# Patient Record
Sex: MALE | Race: BLACK OR AFRICAN AMERICAN | NOT HISPANIC OR LATINO | URBAN - METROPOLITAN AREA
[De-identification: names, ages, dates, MRNs, and addresses within clinical notes are randomized per-mention and may not be internally consistent; named-entity substitution may affect disease eponyms.]

---

## 2024-03-27 ENCOUNTER — HOSPITAL ENCOUNTER (OUTPATIENT)
Facility: HOSPITAL | Age: 59
Setting detail: OBSERVATION
Discharge: HOME | End: 2024-03-28
Attending: EMERGENCY MEDICINE | Admitting: NURSE PRACTITIONER

## 2024-03-27 ENCOUNTER — APPOINTMENT (OUTPATIENT)
Dept: CARDIOLOGY | Facility: HOSPITAL | Age: 59
End: 2024-03-27

## 2024-03-27 ENCOUNTER — APPOINTMENT (OUTPATIENT)
Dept: RADIOLOGY | Facility: HOSPITAL | Age: 59
End: 2024-03-27

## 2024-03-27 ENCOUNTER — CLINICAL SUPPORT (OUTPATIENT)
Dept: EMERGENCY MEDICINE | Facility: HOSPITAL | Age: 59
End: 2024-03-27

## 2024-03-27 DIAGNOSIS — R55 SYNCOPE AND COLLAPSE: Primary | ICD-10-CM

## 2024-03-27 LAB
ALBUMIN SERPL BCP-MCNC: 4.3 G/DL (ref 3.4–5)
ALP SERPL-CCNC: 94 U/L (ref 33–120)
ALT SERPL W P-5'-P-CCNC: 18 U/L (ref 10–52)
ANION GAP BLDV CALCULATED.4IONS-SCNC: 6 MMOL/L (ref 10–25)
ANION GAP SERPL CALC-SCNC: 16 MMOL/L (ref 10–20)
AST SERPL W P-5'-P-CCNC: 20 U/L (ref 9–39)
ATRIAL RATE: 78 BPM
BASE EXCESS BLDV CALC-SCNC: 9.2 MMOL/L (ref -2–3)
BASOPHILS # BLD AUTO: 0.04 X10*3/UL (ref 0–0.1)
BASOPHILS NFR BLD AUTO: 0.6 %
BILIRUB SERPL-MCNC: 0.5 MG/DL (ref 0–1.2)
BNP SERPL-MCNC: 11 PG/ML (ref 0–99)
BODY TEMPERATURE: 37 DEGREES CELSIUS
BUN SERPL-MCNC: 5 MG/DL (ref 6–23)
CA-I BLDV-SCNC: 1.18 MMOL/L (ref 1.1–1.33)
CALCIUM SERPL-MCNC: 9.5 MG/DL (ref 8.6–10.6)
CARDIAC TROPONIN I PNL SERPL HS: 3 NG/L (ref 0–53)
CARDIAC TROPONIN I PNL SERPL HS: 3 NG/L (ref 0–53)
CHLORIDE BLDV-SCNC: 98 MMOL/L (ref 98–107)
CHLORIDE SERPL-SCNC: 98 MMOL/L (ref 98–107)
CO2 SERPL-SCNC: 29 MMOL/L (ref 21–32)
CREAT SERPL-MCNC: 0.63 MG/DL (ref 0.5–1.3)
EGFRCR SERPLBLD CKD-EPI 2021: >90 ML/MIN/1.73M*2
EOSINOPHIL # BLD AUTO: 0.09 X10*3/UL (ref 0–0.7)
EOSINOPHIL NFR BLD AUTO: 1.4 %
ERYTHROCYTE [DISTWIDTH] IN BLOOD BY AUTOMATED COUNT: 12.4 % (ref 11.5–14.5)
GLUCOSE BLDV-MCNC: 135 MG/DL (ref 74–99)
GLUCOSE SERPL-MCNC: 117 MG/DL (ref 74–99)
HCO3 BLDV-SCNC: 35.8 MMOL/L (ref 22–26)
HCT VFR BLD AUTO: 43.4 % (ref 41–52)
HCT VFR BLD EST: 49 % (ref 41–52)
HGB BLD-MCNC: 15.6 G/DL (ref 13.5–17.5)
HGB BLDV-MCNC: 16.3 G/DL (ref 13.5–17.5)
HOLD SPECIMEN: NORMAL
IMM GRANULOCYTES # BLD AUTO: 0.02 X10*3/UL (ref 0–0.7)
IMM GRANULOCYTES NFR BLD AUTO: 0.3 % (ref 0–0.9)
LACTATE BLDV-SCNC: 1.7 MMOL/L (ref 0.4–2)
LACTATE BLDV-SCNC: 2.7 MMOL/L (ref 0.4–2)
LYMPHOCYTES # BLD AUTO: 1.22 X10*3/UL (ref 1.2–4.8)
LYMPHOCYTES NFR BLD AUTO: 19.1 %
MAGNESIUM SERPL-MCNC: 1.66 MG/DL (ref 1.6–2.4)
MCH RBC QN AUTO: 32.3 PG (ref 26–34)
MCHC RBC AUTO-ENTMCNC: 35.9 G/DL (ref 32–36)
MCV RBC AUTO: 90 FL (ref 80–100)
MONOCYTES # BLD AUTO: 0.42 X10*3/UL (ref 0.1–1)
MONOCYTES NFR BLD AUTO: 6.6 %
NEUTROPHILS # BLD AUTO: 4.59 X10*3/UL (ref 1.2–7.7)
NEUTROPHILS NFR BLD AUTO: 72 %
NRBC BLD-RTO: 0 /100 WBCS (ref 0–0)
OXYHGB MFR BLDV: 67.8 % (ref 45–75)
P AXIS: 65 DEGREES
P OFFSET: 184 MS
P ONSET: 125 MS
PCO2 BLDV: 54 MM HG (ref 41–51)
PH BLDV: 7.43 PH (ref 7.33–7.43)
PLATELET # BLD AUTO: 240 X10*3/UL (ref 150–450)
PO2 BLDV: 48 MM HG (ref 35–45)
POTASSIUM BLDV-SCNC: 3.1 MMOL/L (ref 3.5–5.3)
POTASSIUM SERPL-SCNC: 3 MMOL/L (ref 3.5–5.3)
PR INTERVAL: 162 MS
PROT SERPL-MCNC: 7.4 G/DL (ref 6.4–8.2)
Q ONSET: 206 MS
QRS COUNT: 13 BEATS
QRS DURATION: 108 MS
QT INTERVAL: 364 MS
QTC CALCULATION(BAZETT): 414 MS
QTC FREDERICIA: 397 MS
R AXIS: 30 DEGREES
RBC # BLD AUTO: 4.83 X10*6/UL (ref 4.5–5.9)
SAO2 % BLDV: 72 % (ref 45–75)
SODIUM BLDV-SCNC: 137 MMOL/L (ref 136–145)
SODIUM SERPL-SCNC: 140 MMOL/L (ref 136–145)
T AXIS: 54 DEGREES
T OFFSET: 388 MS
VENTRICULAR RATE: 78 BPM
WBC # BLD AUTO: 6.4 X10*3/UL (ref 4.4–11.3)

## 2024-03-27 PROCEDURE — 2500000004 HC RX 250 GENERAL PHARMACY W/ HCPCS (ALT 636 FOR OP/ED): Performed by: EMERGENCY MEDICINE

## 2024-03-27 PROCEDURE — 2550000001 HC RX 255 CONTRASTS: Performed by: NURSE PRACTITIONER

## 2024-03-27 PROCEDURE — 93010 ELECTROCARDIOGRAM REPORT: CPT | Performed by: NURSE PRACTITIONER

## 2024-03-27 PROCEDURE — 84132 ASSAY OF SERUM POTASSIUM: CPT

## 2024-03-27 PROCEDURE — 71045 X-RAY EXAM CHEST 1 VIEW: CPT

## 2024-03-27 PROCEDURE — 85025 COMPLETE CBC W/AUTO DIFF WBC: CPT | Performed by: NURSE PRACTITIONER

## 2024-03-27 PROCEDURE — 75574 CT ANGIO HRT W/3D IMAGE: CPT | Performed by: RADIOLOGY

## 2024-03-27 PROCEDURE — 96374 THER/PROPH/DIAG INJ IV PUSH: CPT

## 2024-03-27 PROCEDURE — 36415 COLL VENOUS BLD VENIPUNCTURE: CPT

## 2024-03-27 PROCEDURE — 71045 X-RAY EXAM CHEST 1 VIEW: CPT | Performed by: INTERNAL MEDICINE

## 2024-03-27 PROCEDURE — 83880 ASSAY OF NATRIURETIC PEPTIDE: CPT | Performed by: NURSE PRACTITIONER

## 2024-03-27 PROCEDURE — 99285 EMERGENCY DEPT VISIT HI MDM: CPT | Performed by: NURSE PRACTITIONER

## 2024-03-27 PROCEDURE — 83735 ASSAY OF MAGNESIUM: CPT | Performed by: NURSE PRACTITIONER

## 2024-03-27 PROCEDURE — 99285 EMERGENCY DEPT VISIT HI MDM: CPT | Mod: 25

## 2024-03-27 PROCEDURE — 96376 TX/PRO/DX INJ SAME DRUG ADON: CPT

## 2024-03-27 PROCEDURE — 93880 EXTRACRANIAL BILAT STUDY: CPT

## 2024-03-27 PROCEDURE — G0378 HOSPITAL OBSERVATION PER HR: HCPCS

## 2024-03-27 PROCEDURE — 2500000002 HC RX 250 W HCPCS SELF ADMINISTERED DRUGS (ALT 637 FOR MEDICARE OP, ALT 636 FOR OP/ED): Performed by: NURSE PRACTITIONER

## 2024-03-27 PROCEDURE — 84132 ASSAY OF SERUM POTASSIUM: CPT | Performed by: NURSE PRACTITIONER

## 2024-03-27 PROCEDURE — 93005 ELECTROCARDIOGRAM TRACING: CPT

## 2024-03-27 PROCEDURE — 83605 ASSAY OF LACTIC ACID: CPT

## 2024-03-27 PROCEDURE — 36415 COLL VENOUS BLD VENIPUNCTURE: CPT | Performed by: NURSE PRACTITIONER

## 2024-03-27 PROCEDURE — 84484 ASSAY OF TROPONIN QUANT: CPT | Performed by: NURSE PRACTITIONER

## 2024-03-27 PROCEDURE — 96361 HYDRATE IV INFUSION ADD-ON: CPT

## 2024-03-27 PROCEDURE — 2500000001 HC RX 250 WO HCPCS SELF ADMINISTERED DRUGS (ALT 637 FOR MEDICARE OP): Performed by: NURSE PRACTITIONER

## 2024-03-27 PROCEDURE — 82810 BLOOD GASES O2 SAT ONLY: CPT

## 2024-03-27 PROCEDURE — 2500000004 HC RX 250 GENERAL PHARMACY W/ HCPCS (ALT 636 FOR OP/ED): Performed by: NURSE PRACTITIONER

## 2024-03-27 PROCEDURE — 75574 CT ANGIO HRT W/3D IMAGE: CPT

## 2024-03-27 PROCEDURE — 93880 EXTRACRANIAL BILAT STUDY: CPT | Performed by: RADIOLOGY

## 2024-03-27 RX ORDER — POTASSIUM CHLORIDE 20 MEQ/1
40 TABLET, EXTENDED RELEASE ORAL DAILY
Status: DISCONTINUED | OUTPATIENT
Start: 2024-03-27 | End: 2024-03-28 | Stop reason: HOSPADM

## 2024-03-27 RX ORDER — LABETALOL HYDROCHLORIDE 5 MG/ML
80 INJECTION, SOLUTION INTRAVENOUS ONCE AS NEEDED
Status: COMPLETED | OUTPATIENT
Start: 2024-03-27 | End: 2024-03-27

## 2024-03-27 RX ORDER — LABETALOL 100 MG/1
400 TABLET, FILM COATED ORAL ONCE
Status: COMPLETED | OUTPATIENT
Start: 2024-03-27 | End: 2024-03-27

## 2024-03-27 RX ORDER — LORAZEPAM 2 MG/ML
0.5 INJECTION INTRAMUSCULAR EVERY 5 MIN PRN
Status: DISCONTINUED | OUTPATIENT
Start: 2024-03-27 | End: 2024-03-28 | Stop reason: HOSPADM

## 2024-03-27 RX ORDER — NITROGLYCERIN 0.4 MG/1
0.8 TABLET SUBLINGUAL ONCE
Status: COMPLETED | OUTPATIENT
Start: 2024-03-27 | End: 2024-03-27

## 2024-03-27 RX ORDER — LABETALOL HYDROCHLORIDE 5 MG/ML
40 INJECTION, SOLUTION INTRAVENOUS ONCE
Status: COMPLETED | OUTPATIENT
Start: 2024-03-27 | End: 2024-03-27

## 2024-03-27 RX ADMIN — POTASSIUM CHLORIDE 40 MEQ: 1500 TABLET, EXTENDED RELEASE ORAL at 09:11

## 2024-03-27 RX ADMIN — LABETALOL HYDROCHLORIDE 80 MG: 5 INJECTION, SOLUTION INTRAVENOUS at 14:08

## 2024-03-27 RX ADMIN — NITROGLYCERIN 0.8 MG: 0.4 TABLET SUBLINGUAL at 14:20

## 2024-03-27 RX ADMIN — LABETALOL HYDROCHLORIDE 80 MG: 5 INJECTION, SOLUTION INTRAVENOUS at 14:17

## 2024-03-27 RX ADMIN — LABETALOL HYDROCHLORIDE 400 MG: 100 TABLET, FILM COATED ORAL at 11:32

## 2024-03-27 RX ADMIN — SODIUM CHLORIDE, SODIUM LACTATE, POTASSIUM CHLORIDE, AND CALCIUM CHLORIDE 1000 ML: 600; 310; 30; 20 INJECTION, SOLUTION INTRAVENOUS at 10:15

## 2024-03-27 RX ADMIN — IOHEXOL 90 ML: 350 INJECTION, SOLUTION INTRAVENOUS at 14:35

## 2024-03-27 RX ADMIN — LABETALOL HYDROCHLORIDE 40 MG: 5 INJECTION, SOLUTION INTRAVENOUS at 13:34

## 2024-03-27 ASSESSMENT — COLUMBIA-SUICIDE SEVERITY RATING SCALE - C-SSRS
1. IN THE PAST MONTH, HAVE YOU WISHED YOU WERE DEAD OR WISHED YOU COULD GO TO SLEEP AND NOT WAKE UP?: NO
6. HAVE YOU EVER DONE ANYTHING, STARTED TO DO ANYTHING, OR PREPARED TO DO ANYTHING TO END YOUR LIFE?: NO
2. HAVE YOU ACTUALLY HAD ANY THOUGHTS OF KILLING YOURSELF?: NO

## 2024-03-27 ASSESSMENT — PAIN - FUNCTIONAL ASSESSMENT
PAIN_FUNCTIONAL_ASSESSMENT: 0-10
PAIN_FUNCTIONAL_ASSESSMENT: 0-10

## 2024-03-27 ASSESSMENT — PAIN SCALES - GENERAL
PAINLEVEL_OUTOF10: 0 - NO PAIN
PAINLEVEL_OUTOF10: 0 - NO PAIN

## 2024-03-27 NOTE — H&P
"History and Physical  [facility] CLINICAL DECISION  Patient: Brian Bob  MRN: [id number]  : 1965  Date of Evaluation: 3/27/2024  ED Provider: CAROL Hoover      Limitations to history: none  Independent Historian: patient  External Records Reviewed: outside records, inpatient notes, ed records      Patient History:  Brian Bob is a 58 y.o. male who presents to the emergency department after passing out.  Patient states that [place] [employer] [place] today and [place] out.  Patient remembers sweating and was dizzy.  Denies any chest pain or shortness of breath.  Patient states that he thinks someone caught him and lowered him down to the ground.  Denies any head injury, headache or visual changes.  Patient states that he has not seen a primary care doctor in years. Patient admits to going to a party last night and did \"many lines of cocaine.\"    PMH: Denies  PSH: hernia repair  Allergies: NKDA  Social HX: + smoker, occasional alcohol use, + cocaine use  Medications: Denies    The acute evaluation included:  Orders Placed This Encounter   Procedures    XR chest 1 view    CT angio coronary art with heartflow if score >30%    CBC and Auto Differential    Magnesium    Comprehensive metabolic panel    Blood Gas Venous Full Panel    Troponin I, High Sensitivity    B-type natriuretic peptide    Blood Gas Lactic Acid, Venous    Extra Tubes    Light Blue Top    Troponin I, High Sensitivity    Vital Signs    Notify provider    Nursing communication for Metoprolol/Labetalol dosing    Telemetry monitoring    ECG 12 lead    ECG 12 lead    Transthoracic Echo (TTE) Complete    Initiate observation status       I reviewed the below labs and imaging as ordered by the ED provider:  XR chest 1 view   Final Result   1.  No evidence of acute cardiopulmonary process.        I personally reviewed the image(s)/study and resident interpretation.   I agree with the findings as stated by resident " Jez Weber.   Data analyzed and images interpreted at [facility], [place], [place].        MACRO:   None        Signed by: Peng Hill 3/27/2024 10:12 AM   Dictation workstation:   [id number]      Vascular US carotid artery duplex bilateral    (Results Pending)   Transthoracic Echo (TTE) Complete    (Results Pending)   CT angio coronary art with heartflow if score >30%    (Results Pending)       Labs Reviewed   COMPREHENSIVE METABOLIC PANEL - Abnormal       Result Value    Glucose 117 (*)     Sodium 140      Potassium 3.0 (*)     Chloride 98      Bicarbonate 29      Anion Gap 16      Urea Nitrogen 5 (*)     Creatinine 0.63      eGFR >90      Calcium 9.5      Albumin 4.3      Alkaline Phosphatase 94      Total Protein 7.4      AST 20      Bilirubin, Total 0.5      ALT 18     BLOOD GAS VENOUS FULL PANEL UNSOLICITED - Abnormal    POCT pH, Venous 7.43      POCT pCO2, Venous 54 (*)     POCT pO2, Venous 48 (*)     POCT SO2, Venous 72      POCT Oxy Hemoglobin, Venous 67.8      POCT Hematocrit Calculated, Venous 49.0      POCT Sodium, Venous 137      POCT Potassium, Venous 3.1 (*)     POCT Chloride, Venous 98      POCT Ionized Calicum, Venous 1.18      POCT Glucose, Venous 135 (*)     POCT Lactate, Venous 2.7 (*)     POCT Base Excess, Venous 9.2 (*)     POCT HCO3 Calculated, Venous 35.8 (*)     POCT Hemoglobin, Venous 16.3      POCT Anion Gap, Venous 6.0 (*)     Patient Temperature 37.0     MAGNESIUM - Normal    Magnesium 1.66     TROPONIN I, HIGH SENSITIVITY - Normal    Troponin I, High Sensitivity 3      Narrative:     Less than 99th percentile of normal range cutoff-  Female and children under 18 years old <35 ng/L; Male <54 ng/L: Negative  Repeat testing should be performed if clinically indicated.     Female and children under 18 years old  ng/L; Male  ng/L:  Consistent with possible cardiac damage and possible increased clinical   risk. Serial measurements  may help to assess extent of myocardial damage.     >120 ng/L: Consistent with cardiac damage, increased clinical risk and  myocardial infarction. Serial measurements may help assess extent of   myocardial damage.      NOTE: Children less than 1 year old may have higher baseline troponin   levels and results should be interpreted in conjunction with the overall   clinical context.    NOTE: Troponin I testing is performed using a different   testing methodology at [facility] than at other   [facility]. Direct result comparisons should only   be made within the same method.     B-TYPE NATRIURETIC PEPTIDE - Normal    BNP 11      Narrative:        <100 pg/mL - Heart failure unlikely  100-299 pg/mL - Intermediate probability of acute heart                  failure exacerbation. Correlate with clinical                  context and patient history.    >=300 pg/mL - Heart Failure likely. Correlate with clinical                  context and patient history.     Biotin interference may cause falsely decreased results. Patients taking a Biotin dose of up to 5 mg/day should refrain from taking Biotin for 24 hours before sample  collection. Providers may contact their local laboratory for further information.   BLOOD GAS LACTIC ACID, VENOUS - Normal    POCT Lactate, Venous 1.7     CBC WITH AUTO DIFFERENTIAL    WBC 6.4      nRBC 0.0      RBC 4.83      Hemoglobin 15.6      Hematocrit 43.4      MCV 90      MCH 32.3      MCHC 35.9      RDW 12.4      Platelets 240      Neutrophils % 72.0      Immature Granulocytes %, Automated 0.3      Lymphocytes % 19.1      Monocytes % 6.6      Eosinophils % 1.4      Basophils % 0.6      Neutrophils Absolute 4.59      Immature Granulocytes Absolute, Automated 0.02      Lymphocytes Absolute 1.22      Monocytes Absolute 0.42      Eosinophils Absolute 0.09      Basophils Absolute 0.04     BLOOD GAS VENOUS FULL PANEL   TROPONIN I, HIGH SENSITIVITY           After discussion with the  "ED provider, a decision was made to admit the patient to the Clinical Decision Unit.    Upon admission to the Clinical Decision Unit, the patient's vital signs were: Blood pressure 151/107, HR 82, RR 18    Past History   No past medical history on file.  No past surgical history on file.  Social History     Socioeconomic History    Marital status:      Spouse name: Not on file    Number of children: Not on file    Years of education: Not on file    Highest education level: Not on file   Occupational History    Not on file   Tobacco Use    Smoking status: Not on file    Smokeless tobacco: Not on file   Substance and Sexual Activity    Alcohol use: Not on file    Drug use: Not on file    Sexual activity: Not on file   Other Topics Concern    Not on file   Social History Narrative    Not on file     Social Determinants of Health     Financial Resource Strain: Not on file   Food Insecurity: Not on file   Transportation Needs: Not on file   Physical Activity: Not on file   Stress: Not on file   Social Connections: Not on file   Intimate Partner Violence: Not on file   Housing Stability: Not on file         Medications/Allergies     Previous Medications    No medications on file     No Known Allergies      Review of Systems  All systems reviewed and otherwise negative, except as stated above in HPI.      Physical Exam     Visit Vitals  BP (!) 151/107   Pulse 82   Resp 18   Ht 1.702 m (5' 7\")   Wt 73.5 kg (162 lb)   SpO2 96%   BMI 25.37 kg/m²   BSA 1.86 m²         Physical Exam:    Appearance: Alert, oriented , cooperative,  in no acute distress. Well nourished & well hydrated.    Skin: Intact,  dry skin, no lesions, rash, petechiae or purpura.     Eyes: PERRLA, EOMs intact.    ENT: Hearing grossly intact. External auditory canals patent, tympanic membranes intact with visible landmarks. Nares patent, mucus membranes moist.     Neck: Supple, without meningismus.     Pulmonary: Clear bilaterally with good chest wall " excursion. No rales, rhonchi or wheezing. No accessory muscle use or stridor.    Cardiac: Normal S1, S2 without murmur, rub, gallop or extrasystole. No JVD, Carotids without bruits.    Abdomen: Soft, nontender, active bowel sounds.  No palpable organomegaly.  No rebound or guarding.      Musculoskeletal: Full range of motion. no pain, edema, or deformity. Pulses full and equal. No cyanosis, clubbing, or edema.    Neurological:  Cranial nerves II through XII are grossly intact, finger-nose touch is normal, normal sensation, no weakness, no focal findings identified.    Psychiatric: Appropriate mood and affect.           Impression and Plan  In summary, Brian Bob is admitted to the [facility] Clinical Decision Unit for syncope. Dr. Marrero is the CDU admission attending.    This patient has been risk-stratified based on available history, physical exam, and related study findings. Admission to the observation status for further diagnosis/treatment/monitoring of syncope is warranted clinically. This extended period of observation is specifically required to determine the need for hospitalization.     The goals of this admission based on the patient’s clinical problem list are:  1) Symptomatic improvement of symptoms  2) Stable vital signs    Syncope  - Per ED team: Carotid vascular US, Echo and Coronary CTA per requested.  - Trend troponin  - Cardiac Monitor  - Continue to monitor blood pressure readings

## 2024-03-28 ENCOUNTER — APPOINTMENT (OUTPATIENT)
Dept: CARDIOLOGY | Facility: HOSPITAL | Age: 59
End: 2024-03-28

## 2024-03-28 VITALS
HEART RATE: 63 BPM | TEMPERATURE: 97.4 F | WEIGHT: 162 LBS | SYSTOLIC BLOOD PRESSURE: 149 MMHG | BODY MASS INDEX: 25.43 KG/M2 | OXYGEN SATURATION: 99 % | HEIGHT: 67 IN | RESPIRATION RATE: 18 BRPM | DIASTOLIC BLOOD PRESSURE: 92 MMHG

## 2024-03-28 LAB
AORTIC VALVE MEAN GRADIENT: 4 MMHG
AORTIC VALVE PEAK VELOCITY: 1.24 M/S
AV PEAK GRADIENT: 6.2 MMHG
AVA (PEAK VEL): 3.62 CM2
AVA (VTI): 3.75 CM2
EJECTION FRACTION APICAL 4 CHAMBER: 48.9
EJECTION FRACTION: 51 %
LEFT ATRIUM VOLUME AREA LENGTH INDEX BSA: 21.3 ML/M2
LEFT VENTRICLE INTERNAL DIMENSION DIASTOLE: 5.2 CM (ref 3.5–6)
LEFT VENTRICULAR OUTFLOW TRACT DIAMETER: 2.3 CM
MITRAL VALVE E/A RATIO: 1.08
MITRAL VALVE E/E' RATIO: 6.39
RIGHT VENTRICLE FREE WALL PEAK S': 12.1 CM/S
TRICUSPID ANNULAR PLANE SYSTOLIC EXCURSION: 2.1 CM

## 2024-03-28 PROCEDURE — 2500000002 HC RX 250 W HCPCS SELF ADMINISTERED DRUGS (ALT 637 FOR MEDICARE OP, ALT 636 FOR OP/ED): Performed by: NURSE PRACTITIONER

## 2024-03-28 PROCEDURE — 93306 TTE W/DOPPLER COMPLETE: CPT | Performed by: SPECIALIST

## 2024-03-28 PROCEDURE — G0378 HOSPITAL OBSERVATION PER HR: HCPCS

## 2024-03-28 PROCEDURE — 93306 TTE W/DOPPLER COMPLETE: CPT

## 2024-03-28 RX ADMIN — POTASSIUM CHLORIDE 40 MEQ: 1500 TABLET, EXTENDED RELEASE ORAL at 10:46

## 2024-03-28 NOTE — DISCHARGE INSTRUCTIONS
Follow-up with your primary care doctor.  It is recommended to stay away from cocaine as it most likely attributed to your passing out episode.

## 2024-03-28 NOTE — DISCHARGE SUMMARY
"CDU   Disposition Note    Date of Placement in CDU: 3/27  Time of Disposition: 945    Subjective  Mr. Bob has undergone comprehensive diagnostic evaluation and therapeutic management in accordance with the CDU guidelines for syncope. Based on his clinical response and diagnostic information during this period of observation, it has been determined that the patient will be discharged home.    ED/CDU course:  Brian Bob is a 58 y.o. male who presents to the emergency department after passing out.  Patient states that [place] [employer] [place] and [place] out.  Patient remembers sweating and was dizzy.  Denies any chest pain or shortness of breath.  Patient states that he thinks someone caught him and lowered him down to the ground.  Denies any head injury, headache or visual changes.  Patient admits to going to a party last night and did \"many lines of cocaine.\"   The acute evaluation in the ED was unremarkable including negative troponin, nonischemic EKG, mild hypokalemia which was repleted orally, unremarkable chest x-ray.  Did receive CT angiogram of chest which showed no significant stenosis, calcium score of 0.  Ultrasound of carotid arteries without significant stenosis.  Was admitted to CDU for echocardiogram.  Echocardiogram was unremarkable with normal EF.  He had no significant events throughout stay in CDU including normal telemetry, no return of symptoms including lightheadedness dizziness or syncope.  On time of disposition he was reevaluated stating he feels well, ambulating comfortably, symptom-free.    Physical Exam:   GENERAL.: Vitals noted. No distress.  Normocephalic atraumatic.   EYES:  PERRLA, EOMs intact  OROPHARYNX:  No erythema or exudate.  Mucosa moist.  NECK: Supple. No adenopathy.  CARDIAC: Regular rate rhythm. No murmur noted.  PULMONARY: Equal breath sounds bilaterally.  No wheezes rales or rhonchi  ABDOMEN: Soft, nontender, nonsurgical. No guarding. Normoactive bowel sounds. " No bruits, no masses  EXTREMITIES: Full ROM, no pitting edema,   SKIN: Intact, warm and dry  NEURO: Alert and oriented x 3, speech is clear, no obvious deficits noted.       Diagnostic Evaluation  Diagnostic studies germane to this period of clinical observation include:   1) unremarkable carotid artery ultrasound and TTE as well as CTA coronary    Impression and Plan  Mr. Bob has been cared for according to the standard [facility] Clinical Decision Unit observation protocol for syncope. This extended period of observation was specifically required to determine the need for hospitalization. Prior to discharge from observation, the final physical exam is documented above.     Significant events during the course of observation based on the goals of the clinical problem list include:   1) unremarkable CTA coronary, carotid artery ultrasound, TTE  2) no events on telemetry  3) asymptomatic well-appearing with benign exam    Based on the patient's condition and test results, the patient will be discharged home    Total length of observation was 23 hours. Dr. Driscoll is the CDU disposition attending.    The patient will follow up with:  1) his PCP    As appropriate, please see the Exit Care module for comprehensive discharge instructions.

## 2024-03-28 NOTE — PROGRESS NOTES
Subjective  Brian Bob is a 58 y.o. male on day 1 of admission presenting with Syncope and collapse.   Serial assessments of clinical progress include:  1.)  Patient denies chest pain, shortness of breath.  Echo pending in a.m.  2.)  Vascular ultrasound carotid arteries shows There is no evidence of atheromatous plaques in the  common carotid arteries bilaterally.  3.) Patient remained hemodynamically stable and neurologically intact on the rest of the night.      Objective  VS reviewed  Physical Exam:  GENERAL:  The patient appears non-toxic and well nourished. Vital signs as documented.     Appearance: Alert, oriented, cooperative, in no acute distress. Well nourished & well hydrated.    HEENT:  Head normocephalic, atraumatic, EOMs intact, PERRLA, Mucous membranes moist. Nares patent without copious rhinorrhea.  Oropharynx moist and clear.  Uvula midline.    Neck: Supple.  No meningismus.  No swelling.  Trachea midline. No lymphadenopathy.    Pulmonary:  Lungs are clear to auscultation, without any respiratory distress.  No wheezing, crackles or rales.  No hypoxia or dyspnea.  Able to speak full sentences, no accessory muscle use.    Cardiac: Regular rate and rhythm. No murmurs, rubs or gallops.  No JVD.    GI:  Soft, non-distended, non-tender, BS positive x 4 quadrants, No rebound or guarding, no peritoneal signs, no CVA tenderness, no masses or organomegaly.    Musculoskeletal: Symmetrical muscle bulk.  No peripheral edema.  Pulses intact distal.  Able to walk.    Integumentary: Warm, dry and intact.  No pallor or jaundice.  No lesions, rashes or open sores.    NEURO:  No obvious neurological deficits, normal sensation and strength bilaterally.  Speech clear and fluent.  Able to follow commands, CN 2-12 intact.    Psych: Appropriate mood and affect.      Relevant Results  Results for orders placed or performed during the hospital encounter of 03/27/24 (from the past 24 hour(s))   Blood Gas Venous Full Panel  Unsolicited   Result Value Ref Range    POCT pH, Venous 7.43 7.33 - 7.43 pH    POCT pCO2, Venous 54 (H) 41 - 51 mm Hg    POCT pO2, Venous 48 (H) 35 - 45 mm Hg    POCT SO2, Venous 72 45 - 75 %    POCT Oxy Hemoglobin, Venous 67.8 45.0 - 75.0 %    POCT Hematocrit Calculated, Venous 49.0 41.0 - 52.0 %    POCT Sodium, Venous 137 136 - 145 mmol/L    POCT Potassium, Venous 3.1 (L) 3.5 - 5.3 mmol/L    POCT Chloride, Venous 98 98 - 107 mmol/L    POCT Ionized Calicum, Venous 1.18 1.10 - 1.33 mmol/L    POCT Glucose, Venous 135 (H) 74 - 99 mg/dL    POCT Lactate, Venous 2.7 (H) 0.4 - 2.0 mmol/L    POCT Base Excess, Venous 9.2 (H) -2.0 - 3.0 mmol/L    POCT HCO3 Calculated, Venous 35.8 (H) 22.0 - 26.0 mmol/L    POCT Hemoglobin, Venous 16.3 13.5 - 17.5 g/dL    POCT Anion Gap, Venous 6.0 (L) 10.0 - 25.0 mmol/L    Patient Temperature 37.0 degrees Celsius   CBC and Auto Differential   Result Value Ref Range    WBC 6.4 4.4 - 11.3 x10*3/uL    nRBC 0.0 0.0 - 0.0 /100 WBCs    RBC 4.83 4.50 - 5.90 x10*6/uL    Hemoglobin 15.6 13.5 - 17.5 g/dL    Hematocrit 43.4 41.0 - 52.0 %    MCV 90 80 - 100 fL    MCH 32.3 26.0 - 34.0 pg    MCHC 35.9 32.0 - 36.0 g/dL    RDW 12.4 11.5 - 14.5 %    Platelets 240 150 - 450 x10*3/uL    Neutrophils % 72.0 40.0 - 80.0 %    Immature Granulocytes %, Automated 0.3 0.0 - 0.9 %    Lymphocytes % 19.1 13.0 - 44.0 %    Monocytes % 6.6 2.0 - 10.0 %    Eosinophils % 1.4 0.0 - 6.0 %    Basophils % 0.6 0.0 - 2.0 %    Neutrophils Absolute 4.59 1.20 - 7.70 x10*3/uL    Immature Granulocytes Absolute, Automated 0.02 0.00 - 0.70 x10*3/uL    Lymphocytes Absolute 1.22 1.20 - 4.80 x10*3/uL    Monocytes Absolute 0.42 0.10 - 1.00 x10*3/uL    Eosinophils Absolute 0.09 0.00 - 0.70 x10*3/uL    Basophils Absolute 0.04 0.00 - 0.10 x10*3/uL   Magnesium   Result Value Ref Range    Magnesium 1.66 1.60 - 2.40 mg/dL   Comprehensive metabolic panel   Result Value Ref Range    Glucose 117 (H) 74 - 99 mg/dL    Sodium 140 136 - 145 mmol/L     Potassium 3.0 (L) 3.5 - 5.3 mmol/L    Chloride 98 98 - 107 mmol/L    Bicarbonate 29 21 - 32 mmol/L    Anion Gap 16 10 - 20 mmol/L    Urea Nitrogen 5 (L) 6 - 23 mg/dL    Creatinine 0.63 0.50 - 1.30 mg/dL    eGFR >90 >60 mL/min/1.73m*2    Calcium 9.5 8.6 - 10.6 mg/dL    Albumin 4.3 3.4 - 5.0 g/dL    Alkaline Phosphatase 94 33 - 120 U/L    Total Protein 7.4 6.4 - 8.2 g/dL    AST 20 9 - 39 U/L    Bilirubin, Total 0.5 0.0 - 1.2 mg/dL    ALT 18 10 - 52 U/L   Troponin I, High Sensitivity   Result Value Ref Range    Troponin I, High Sensitivity 3 0 - 53 ng/L   B-type natriuretic peptide   Result Value Ref Range    BNP 11 0 - 99 pg/mL   ECG 12 lead   Result Value Ref Range    Ventricular Rate 78 BPM    Atrial Rate 78 BPM    IL Interval 162 ms    QRS Duration 108 ms    QT Interval 364 ms    QTC Calculation(Bazett) 414 ms    P Axis 65 degrees    R Axis 30 degrees    T Axis 54 degrees    QRS Count 13 beats    Q Onset 206 ms    P Onset 125 ms    P Offset 184 ms    T Offset 388 ms    QTC Fredericia 397 ms   Blood Gas Lactic Acid, Venous   Result Value Ref Range    POCT Lactate, Venous 1.7 0.4 - 2.0 mmol/L   Troponin I, High Sensitivity   Result Value Ref Range    Troponin I, High Sensitivity 3 0 - 53 ng/L   Light Blue Top   Result Value Ref Range    Extra Tube Hold for add-ons.        Imaging Results  ECG 12 lead    Result Date: 3/27/2024  Normal sinus rhythm Possible Anterior infarct , age undetermined Abnormal ECG When compared with ECG of 11-JUL-2016 09:00, Nonspecific T wave abnormality now evident in Inferior leads Inverted T waves have replaced nonspecific T wave abnormality in Lateral leads See ED provider note for full interpretation and clinical correlation Confirmed by Janie Ward ([id number]) on 3/27/2024 10:55:42 PM    Vascular US carotid artery duplex bilateral    Result Date: 3/27/2024  Interpreted By:  Vick Baeza  and Ailyn Tariq STUDY: VASC US CAROTID ARTERY DUPLEX BILATERAL;  3/27/2024 12:18 pm    INDICATION: Signs/Symptoms:syncope.   COMPARISON: None.   ACCESSION NUMBER(S): [id number]   ORDERING CLINICIAN: AUGUSTIN CASTILLO   TECHNIQUE: Vascular ultrasound of the extracranial carotid system was performed bilaterally.  Gray scale, color Doppler and spectral Doppler waveform analysis was performed.   FINDINGS: RIGHT: On the right  There is no evidence of atheromatous plaques in the common carotid arteries, visualized portions of the internal carotid arteries, and the proximal external carotid arteries. Doppler studies demonstrate normal flow pattern without evidence of turbulent flow.. The peak systolic velocities are as follows:   RIGHT SIDE PEAK SYSTOLIC VELOCITY TABLE: CCA 69.8 cm/s. ICA 64.6 cm/s. ECA 54.6 cm/s.   The ratio of the peak systolic velocity of the right ICA/CCA is .9.   RIGHT VERTEBRAL ARTERY: The right vertebral artery demonstrates proximal normal anterograde flow   LEFT: On the left  There is no evidence of atheromatous plaques in the common carotid arteries, visualized portions of the internal carotid arteries, and the proximal external carotid arteries. Doppler studies demonstrate normal flow pattern without evidence of turbulent flow.. The peak systolic velocities are as follows:   LEFT SIDE PEAK SYSTOLIC VELOCITY TABLE: CCA 74.6 cm/s,26.2 cm/s. ICA 53.6 cm/s. .3 cm/s.   The ratio of the peak systolic velocity of the left ICA/CCA is .7.   LEFT VERTEBRAL ARTERY: The left vertebral artery demonstrates proximal normal anterograde flow       Normal flow pattern without evidence of hemodynamically relevant stenosis or atheromatous plaques in the visualized portions of the carotid circulation as described above.   The velocity criteria are extrapolated from diameter data as defined by the Society of Radiologists in Ultrasound Consensus Conference Radiology 2003; 229;340-346.   MACRO: None   Signed by: Vick Milan 3/27/2024 9:06 PM Dictation workstation:    [id number]    CT angio coronary art with heartflow if score >30%    Result Date: 3/27/2024  Interpreted By:  Mickey Lara  and Jose Purcell STUDY: CT ANGIO CORONARY ART WITH HEARTFLOW IF SCORE >30%;  3/27/2024 2:35 pm   INDICATION: Signs/Symptoms:syncope.   COMPARISON: None.   ACCESSION NUMBER(S): [id number]   ORDERING CLINICIAN: ONIEL ALANIS   TECHNIQUE: CT scan of the coronary arteries was performed without intravenous contrast. Coronary calcium scoring was performed according to the method of Agatston.   Using multi-detector CT technology,  spiral imaging with retrospective ECG gating was performed of the chest following the intravenous administration of contrast material.  A low-osmolar contrast agent was used (90 mL Omnipaque 350).   The patient was premedicated with  400 mg p.o. and 40 mg i.v labetalol and 0.8 mg sublingual nitroglycerin for heart rate control and coronary dilation, respectively.   For optimization of anatomic evaluation, multiplanar reconstruction, maximum intensity projections, and advanced 3-D off-line postprocessing were performed on a dedicated stand-alone workstation under the direct supervision of the interpreting physician.   CT Dose-Length Product (DLP):   700 mGy/cm   FINDINGS: POTENTIAL STUDY LIMITATIONS: Motion.   CORONARY ARTERY CALCIUM SCORE   LM       0 LAD      0 LCx      0 RCA      0   Total    0   CORONARY ARTERIES:   CORONARY ANATOMY: There is normal origin of the coronary arteries.   LEFT MAIN CORONARY ARTERY: The left main is normal sized vessel that  trifurcates into LAD,circumflex artery and ramus intermedius. There is no significant atherosclerotic change or stenotic disease.   LEFT ANTERIOR DESCENDING ARTERY: The LAD is a normal size vessel that  wraps around the apex. It gives rise to  1 acute diagonal branch. There is no significant atherosclerotic change or stenotic disease.   LEFT CIRCUMFLEX ARTERY: The LCX is a normal size vessel, which is   non-dominant. It gives rise to  2 obtuse marginal branches and the ludwig branch. There is no significant atherosclerotic change or stenotic disease.   RAMUS INTERMEDIUS: The ramus is a normal sized vessel. It gives rise to  1 obtuse marginal/diagonal branch. There is no significant atherosclerotic change or stenotic disease.   RIGHT CORONARY ARTERY: The RCA is a normal size vessel, which is  dominant . It gives rise to a  conus branch and 2 acute marginal branches.  In its distal segment it bifurcates into the PDA and PV branch. There is no significant atherosclerotic change or stenotic disease.   CARDIAC CHAMBERS: The cardiac chambers demonstrate normal atrioventricular and ventriculoarterial concordance, and systemic and pulmonary venous return.   LEFT ATRIUM: Normal size (14.0-cm2)   RIGHT ATRIUM: Normal size (16.5-cm2)   INTERATRIAL SEPTUM: Intact.   LEFT VENTRICLE: Normal size (4.8-cm)   RIGHT VENTRICLE: Normal size (3.8-cm)   AORTIC VALVE: The aortic valve is  trileaflet in morphology.  No calcifications.   MITRAL VALVE: No thickening/calcification.   THORACIC AORTA: The visualized thoracic aorta is normal in course, caliber, and contour. There is no acute aortic pathology, such as dissection, intramural hematoma, or contained rupture. The aortic arch is not included on this examination.   PERICARDIUM: There is no pericardial effusion of thickening.   CHEST: The chest wall is normal. No significant lymphadenopathy or mass is seen in limited images of the mediastinum. Limited imaging through the lungs reveals dependent atelectasis, otherwise no acute abnormalities. No pleural effusion or pneumothorax.   UPPER ABDOMEN: Limited imaging through the upper abdomen reveals no acute abnormalities of the visualized organs.       1.  Normal coronary anatomy without evidence of atherosclerotic changes or stenotic disease. 2. Coronary calcium score of 0*   *Coronary artery calcium scoring may be helpful in predicting the  "risk for future coronary heart disease events.  According to the American College of Cardiology Foundation Clinical Expert Consensus Task Force, such testing provides important prognostic information in patients with more than one coronary heart disease risk factor. The coronary artery calcium score correlates with the annual risk of a non-fatal myocardial infarction or coronary heart disease death.   Coronary artery score            Annual Risk   0-99                               0.4% 100-399                          1.3% >400                              2.4%   These three \"breakpoints\" correspond to lower, intermediate and high risk states for future coronary events.  Such information should be used, along with appropriate clinical judgment, to make decisions regarding the intensity of risk factor management strategies to treat blood lipids and to modify other non-lipid coronary risk factors.   Reference: Ranger P et al. Circulation.  2007; 115:402-426   I personally reviewed the images/study and I agree with the findings as stated by Binh Garcia MD (resident) . This study was interpreted at [place], [place].   MACRO: None   Signed by: Mickey Lara 3/27/2024 3:09 PM Dictation workstation:   [id number]    XR chest 1 view    Result Date: 3/27/2024  Interpreted By:  Peng Hill and Beyersdorf Conner STUDY: XR CHEST 1 VIEW;  3/27/2024 9:06 am   INDICATION: Signs/Symptoms:syncope.   COMPARISON: Single-view chest 01/25/2014   ACCESSION NUMBER(S): [id number]   ORDERING CLINICIAN: AUGUSTIN CASTILLO   FINDINGS: AP radiograph of the chest was provided.   CARDIOMEDIASTINAL SILHOUETTE: Cardiomediastinal silhouette is normal in size and configuration.   LUNGS: No focal consolidation, pleural effusion, or pneumothorax.   ABDOMEN: No remarkable upper abdominal findings.   BONES: No acute osseous changes.       1.  No evidence of acute cardiopulmonary " process.   I personally reviewed the image(s)/study and resident interpretation. I agree with the findings as stated by resident Jez Weber. Data analyzed and images interpreted at [facility], [place], [place].   MACRO: None   Signed by: Peng Hill 3/27/2024 10:12 AM Dictation workstation:   [id number]      Medications:  potassium chloride CR, 40 mEq, oral, Daily       PRN medications: LORazepam     Assessment/Plan     Brian Bob continues to be managed in accordance with the CDU clinical guidelines for syncope. An update of their clinical problem list included:  1.)  Syncope  -Continuous telemetry monitoring  -Echo in a.m.          We will observe the patient for the following endpoints:   1.) Stable vitals   2.)  Symptomatic improvement  3.)  Clear echo    When met, appropriate disposition will be arranged.    Brian Bob has been admitted to the CDU for 18 hours. I spent 25 minutes in the professional and overall care of this patient   @OBS@    Janie Ward, APRN-CNP  [facility]  Emergency Department  Extension [id number]

## 2025-05-05 NOTE — ED PROVIDER NOTES
----- Message from Ely MURRAY sent at 2/3/2025  3:06 PM EST -----  Started repatha on 2/3/25.  Need to recheck lipids and lpa and b   HPI   Chief Complaint   Patient presents with    Syncope       HPI  This is a 58 year old male with no significant past medical history presented to the ED after he had a syncopal event on the [employer] bus.   He felt lightheaded prior to passing out and eased down onto the floor.  He did not hit his head. He has no chest pain, dyspnea, weakness, numbness, blurry or double vision. He describes feeling lightheaded prior to the event with no nausea, vomiting or any spinning feeling.   He was at a friend's gathering last night where he used some cocaine.  He had a similar event a few years back and was seen here at  (2016).                    Sigel Coma Scale Score: 15                     Patient History   No past medical history on file.  No past surgical history on file.  No family history on file.  Social History     Tobacco Use    Smoking status: Not on file    Smokeless tobacco: Not on file   Substance Use Topics    Alcohol use: Not on file    Drug use: Not on file       Physical Exam   ED Triage Vitals   Temp Pulse Resp BP   -- -- -- --      SpO2 Temp src Heart Rate Source Patient Position   -- -- -- --      BP Location FiO2 (%)     -- --       Physical Exam  Constitutional:       Appearance: Normal appearance.   HENT:      Head: Normocephalic and atraumatic.      Mouth/Throat:      Mouth: Mucous membranes are moist.   Eyes:      Extraocular Movements: Extraocular movements intact.      Pupils: Pupils are equal, round, and reactive to light.   Cardiovascular:      Rate and Rhythm: Normal rate and regular rhythm.   Pulmonary:      Effort: Pulmonary effort is normal.      Breath sounds: Normal breath sounds.   Abdominal:      Palpations: Abdomen is soft.   Skin:     General: Skin is warm and dry.   Neurological:      General: No focal deficit present.      Mental Status: He is alert and oriented to person, place, and time.   Psychiatric:         Mood and Affect: Mood normal.         Behavior: Behavior normal.          Thought Content: Thought content normal.         Judgment: Judgment normal.         ED Course & MDM   Diagnoses as of 03/28/24 0059   Syncope and collapse       Medical Decision Making  This is a 58 year old male with no significant past medical history presented to the ED after he had a [place]opal event [place] [employer] [place].   He felt lightheaded prior to passing out and eased down onto the floor.  He did not hit his head. He has no chest pain, dyspnea, weakness, numbness, blurry or double vision.   He was at a friend's gathering last night where he used some cocaine.  He had a similar event a few years back and was seen here at  (2016).  Differential Dx- orthostatic hypotension, cardiac anomaly, dehydration , drug use     The patient was given 1L  of LR. He was given 40 meq of potassium PO for a potassium of 3.1. Lactate was mildly elevated at 2.7   Chest x-ray showed no acute cardiopulmonary process.   I spoke with the patient who was in agreement with staying in the CDU overnight and have a coronary CTA and an echocardiogram done.   Bilateral carotid US is also ordered.   The patient is staffed with Dr. Marrero.   He is accepted by the CDU provider.     Procedure  Procedures    Attending Note:  The patient was seen by the resident/fellow/ZA.  I have personally performed a substantive portion of the encounter.  I have seen and examined the patient; agree with the workup, evaluation, MDM, management and diagnosis with the exception/addition of the following.  The care plan has been discussed with the resident/fellow; I have reviewed the resident/fellow’s note and agree with the documented findings with the exception/addition of the following:       HPI:   Brian Bob is a 58 year old male with history of cocaine use but who otherwise denies significant medical history presenting to the ED for evaluation. The patient reports that he was riding the bus, felt lightheaded, and eased himself onto the floor, without fall or  trauma. No head strike, neck injury or back pain. No chest pain, palpitations, SOB, weakness, numbness, parasthesia, or diplopia. He denies any nausea, vomiting or abdominal pain. No vertigo. Reports that he believes he lost consciousness for a few moments. He reports using cocaine last night but no drug use this AM, nor use of other substances. He denies headache, neck pain, or focal weakness, numbness or parasthesia.     Patient reports prior syncopal episode in 2016. Per discharge summary, EKG changes were concerning for STEMI.  C per report, with normal coronaries. He underwent a carotid US which demonstrated <50% stenosis in B/L ICA. CTH without acute process.  Discharged home.     Additional History Obtained from: See HPI       Physical Exam:  General: Grossly well-developed, awake, alert, NAD    Head: Normocephalic, no overt external signs of trauma    ENT: Mucus membranes moist, nares patent, no facial TTP    Neck: Supple, trachea midline, no menigismus, no midline C spine TTP    Neurologic: A&Ox4, FS, TM, EOMI, PERRL, MEDINA x 4 with grossly symmetric strength, 5/5, SILT grossly intact BUE and BLE, no obvious dysmetria FNF/heel to shin    Cardiovascular: RRR, pulses grossly symmetric BUE and BLE    Respiratory: CTA B/L, no wheeze, rales or rhonchi    Abdomen: Soft, not appreciably tender, no evident rebound/guarding, no pulsatile masses palpable.     Back: No apparent CVA TTP    MSK: No gross bony deformities in BUE and BLE. No calf pain or palpable cords.     Skin/Integumentary: Warm and dry    Psychiatric: Calm and cooperative. Normal mood and affect.         EKG Interpretation:  EKG 3/27/24 - 0900 - sinus rhythm, rate 78, axis normal, intervals , , QTc 414, limited 2/2 background artifact lateral precordial leads. TWI V5. Otherwise not obvious significant acute ST elevation or depression. Rate increased from 58 on 7/2016.  Interpreted by provider as above    Differential Diagnoses  Considered:  See MDM below    Chronic Medical Conditions Significantly Affecting Care:  See MDM below    External Records Reviewed:  I reviewed recent and relevant outside records as noted in HPI above and MDM below.     Independent Interpretation of Studies:    Laboratory/Imaging Studies:  Laboratory results personally reviewed and independently interpreted:  CBC without significant anemia, thrombocytopenia or leukocytosis  Metabolic panel without CHRISTIE, HAGMA. K 3.0, otherwise no significant electrolyte anomalies  No hypomagnesemia  HS troponin not elevated.    BNP not elevated   VBG 7.43/54/48, repeat lactate WNL    Radiology imaging and preliminary and/or final reports personally reviewed/independently interpreted:  CXR   IMPRESSION:  1.  No evidence of acute cardiopulmonary process.    Social Determinants of Health Significantly Affecting Care:  See HPI/MDM    Prescription Drug Consideration:  See MDM    Diagnostic testing considered:  See MDM and relevant sections      Medical Decision Making/Course:  58 year old male with history of cocaine use but who otherwise denies significant medical history presenting to the ED for evaluation. The patient reports a syncopal episode today. He notes that he had prodromal symptoms of lightheadedness and was able to lower himself to the ground without sustaining any trauma. He denies any acute pain. He denies any headache, neck pain, or focal neurological deficits. No clear history or exam findings consistent with SAH or acute ischemic stroke. No pleurisy, chest pain, leg swelling or history consistent with DVT/PE. No chest pain. EKG and troponin, as well as history and risk factors, were less consistent with ACS.  CXR without signs of pneumothorax, large pneumonia, large effusion, or other acute thoracic process.  Pulses were grossly symmetric and there was no tearing chest pain to the back, or history/exam findings consistent with aortic dissection  No pusatile abdominal  mass or signs of ruptured AAA.  EKG without obvious arrhythmia or predisposing conditions to such. Monitored on telemetry without signs of arrhythmia. Would benefit from admission to CDU for syncope workup including echo, carotid US. Also would benefit from cardiac risk stratification. An opportunity to ask questions was provided and all were addressed at that time.  The patient verbalized understanding and was in agreement with plan.          RADHA Mehta-CNP, MADELAINE  03/27/24 1034       Ricardo Marrero MD  03/28/24 0103